# Patient Record
Sex: FEMALE | Race: WHITE | NOT HISPANIC OR LATINO | Employment: UNEMPLOYED | URBAN - METROPOLITAN AREA
[De-identification: names, ages, dates, MRNs, and addresses within clinical notes are randomized per-mention and may not be internally consistent; named-entity substitution may affect disease eponyms.]

---

## 2024-03-24 ENCOUNTER — HOSPITAL ENCOUNTER (EMERGENCY)
Facility: HOSPITAL | Age: 29
Discharge: HOME | End: 2024-03-24
Attending: EMERGENCY MEDICINE | Admitting: EMERGENCY MEDICINE
Payer: MEDICAID

## 2024-03-24 ENCOUNTER — APPOINTMENT (OUTPATIENT)
Dept: ULTRASOUND IMAGING | Facility: HOSPITAL | Age: 29
End: 2024-03-24
Payer: MEDICAID

## 2024-03-24 ENCOUNTER — APPOINTMENT (OUTPATIENT)
Dept: CT IMAGING | Facility: HOSPITAL | Age: 29
End: 2024-03-24
Payer: MEDICAID

## 2024-03-24 VITALS
HEIGHT: 67 IN | DIASTOLIC BLOOD PRESSURE: 94 MMHG | BODY MASS INDEX: 34.12 KG/M2 | WEIGHT: 217.4 LBS | HEART RATE: 73 BPM | OXYGEN SATURATION: 98 % | RESPIRATION RATE: 16 BRPM | SYSTOLIC BLOOD PRESSURE: 132 MMHG | TEMPERATURE: 98.7 F

## 2024-03-24 DIAGNOSIS — N83.202 LEFT OVARIAN CYST: Primary | ICD-10-CM

## 2024-03-24 DIAGNOSIS — R10.13 EPIGASTRIC PAIN: ICD-10-CM

## 2024-03-24 LAB
ALBUMIN SERPL-MCNC: 4.5 G/DL (ref 3.5–5.2)
ALBUMIN/GLOB SERPL: 1.8 G/DL
ALP SERPL-CCNC: 121 U/L (ref 39–117)
ALT SERPL W P-5'-P-CCNC: 70 U/L (ref 1–33)
ANION GAP SERPL CALCULATED.3IONS-SCNC: 4.1 MMOL/L (ref 5–15)
AST SERPL-CCNC: 48 U/L (ref 1–32)
B-HCG UR QL: NEGATIVE
BASOPHILS # BLD AUTO: 0.04 10*3/MM3 (ref 0–0.2)
BASOPHILS NFR BLD AUTO: 0.6 % (ref 0–1.5)
BILIRUB SERPL-MCNC: 0.3 MG/DL (ref 0–1.2)
BILIRUB UR QL STRIP: NEGATIVE
BUN SERPL-MCNC: 5 MG/DL (ref 6–20)
BUN/CREAT SERPL: 8.1 (ref 7–25)
CALCIUM SPEC-SCNC: 8.8 MG/DL (ref 8.6–10.5)
CHLORIDE SERPL-SCNC: 101 MMOL/L (ref 98–107)
CLARITY UR: CLEAR
CO2 SERPL-SCNC: 31.9 MMOL/L (ref 22–29)
COLOR UR: YELLOW
CREAT SERPL-MCNC: 0.62 MG/DL (ref 0.57–1)
DEPRECATED RDW RBC AUTO: 42.6 FL (ref 37–54)
EGFRCR SERPLBLD CKD-EPI 2021: 123.8 ML/MIN/1.73
EOSINOPHIL # BLD AUTO: 0.02 10*3/MM3 (ref 0–0.4)
EOSINOPHIL NFR BLD AUTO: 0.3 % (ref 0.3–6.2)
ERYTHROCYTE [DISTWIDTH] IN BLOOD BY AUTOMATED COUNT: 12.4 % (ref 12.3–15.4)
GLOBULIN UR ELPH-MCNC: 2.5 GM/DL
GLUCOSE SERPL-MCNC: 114 MG/DL (ref 65–99)
GLUCOSE UR STRIP-MCNC: NEGATIVE MG/DL
HCT VFR BLD AUTO: 40.5 % (ref 34–46.6)
HGB BLD-MCNC: 13.3 G/DL (ref 12–15.9)
HGB UR QL STRIP.AUTO: NEGATIVE
IMM GRANULOCYTES # BLD AUTO: 0.02 10*3/MM3 (ref 0–0.05)
IMM GRANULOCYTES NFR BLD AUTO: 0.3 % (ref 0–0.5)
KETONES UR QL STRIP: NEGATIVE
LEUKOCYTE ESTERASE UR QL STRIP.AUTO: NEGATIVE
LIPASE SERPL-CCNC: 33 U/L (ref 13–60)
LYMPHOCYTES # BLD AUTO: 1.33 10*3/MM3 (ref 0.7–3.1)
LYMPHOCYTES NFR BLD AUTO: 19.6 % (ref 19.6–45.3)
MCH RBC QN AUTO: 30.3 PG (ref 26.6–33)
MCHC RBC AUTO-ENTMCNC: 32.8 G/DL (ref 31.5–35.7)
MCV RBC AUTO: 92.3 FL (ref 79–97)
MONOCYTES # BLD AUTO: 0.4 10*3/MM3 (ref 0.1–0.9)
MONOCYTES NFR BLD AUTO: 5.9 % (ref 5–12)
NEUTROPHILS NFR BLD AUTO: 4.98 10*3/MM3 (ref 1.7–7)
NEUTROPHILS NFR BLD AUTO: 73.3 % (ref 42.7–76)
NITRITE UR QL STRIP: NEGATIVE
PH UR STRIP.AUTO: 7 [PH] (ref 5–8)
PLATELET # BLD AUTO: 327 10*3/MM3 (ref 140–450)
PMV BLD AUTO: 9.1 FL (ref 6–12)
POTASSIUM SERPL-SCNC: 4 MMOL/L (ref 3.5–5.2)
PROT SERPL-MCNC: 7 G/DL (ref 6–8.5)
PROT UR QL STRIP: NEGATIVE
RBC # BLD AUTO: 4.39 10*6/MM3 (ref 3.77–5.28)
SODIUM SERPL-SCNC: 137 MMOL/L (ref 136–145)
SP GR UR STRIP: 1.02 (ref 1–1.03)
UROBILINOGEN UR QL STRIP: NORMAL
WBC NRBC COR # BLD AUTO: 6.79 10*3/MM3 (ref 3.4–10.8)

## 2024-03-24 PROCEDURE — 99285 EMERGENCY DEPT VISIT HI MDM: CPT

## 2024-03-24 PROCEDURE — 81003 URINALYSIS AUTO W/O SCOPE: CPT

## 2024-03-24 PROCEDURE — 63710000001 PROMETHAZINE PER 25 MG: Performed by: PHYSICIAN ASSISTANT

## 2024-03-24 PROCEDURE — 93976 VASCULAR STUDY: CPT

## 2024-03-24 PROCEDURE — 25810000003 SODIUM CHLORIDE 0.9 % SOLUTION: Performed by: PHYSICIAN ASSISTANT

## 2024-03-24 PROCEDURE — 25510000001 IOPAMIDOL PER 1 ML: Performed by: EMERGENCY MEDICINE

## 2024-03-24 PROCEDURE — 74177 CT ABD & PELVIS W/CONTRAST: CPT

## 2024-03-24 PROCEDURE — 81025 URINE PREGNANCY TEST: CPT

## 2024-03-24 PROCEDURE — 76856 US EXAM PELVIC COMPLETE: CPT

## 2024-03-24 PROCEDURE — 80053 COMPREHEN METABOLIC PANEL: CPT | Performed by: PHYSICIAN ASSISTANT

## 2024-03-24 PROCEDURE — 85025 COMPLETE CBC W/AUTO DIFF WBC: CPT | Performed by: PHYSICIAN ASSISTANT

## 2024-03-24 PROCEDURE — 83690 ASSAY OF LIPASE: CPT | Performed by: PHYSICIAN ASSISTANT

## 2024-03-24 RX ORDER — ONDANSETRON 4 MG/1
4 TABLET, ORALLY DISINTEGRATING ORAL EVERY 8 HOURS PRN
Qty: 15 TABLET | Refills: 0 | Status: SHIPPED | OUTPATIENT
Start: 2024-03-24 | End: 2024-03-24

## 2024-03-24 RX ORDER — PROMETHAZINE HYDROCHLORIDE 25 MG/1
25 TABLET ORAL ONCE
Status: COMPLETED | OUTPATIENT
Start: 2024-03-24 | End: 2024-03-24

## 2024-03-24 RX ORDER — PROMETHAZINE HYDROCHLORIDE 25 MG/1
25 TABLET ORAL EVERY 6 HOURS PRN
Qty: 20 TABLET | Refills: 0 | Status: SHIPPED | OUTPATIENT
Start: 2024-03-24

## 2024-03-24 RX ORDER — PANTOPRAZOLE SODIUM 40 MG/1
40 TABLET, DELAYED RELEASE ORAL DAILY
Qty: 21 TABLET | Refills: 0 | Status: SHIPPED | OUTPATIENT
Start: 2024-03-24

## 2024-03-24 RX ORDER — SODIUM CHLORIDE 0.9 % (FLUSH) 0.9 %
10 SYRINGE (ML) INJECTION AS NEEDED
Status: DISCONTINUED | OUTPATIENT
Start: 2024-03-24 | End: 2024-03-24 | Stop reason: HOSPADM

## 2024-03-24 RX ORDER — ONDANSETRON 4 MG/1
4 TABLET, ORALLY DISINTEGRATING ORAL EVERY 8 HOURS PRN
Qty: 20 TABLET | Refills: 0 | Status: SHIPPED | OUTPATIENT
Start: 2024-03-24 | End: 2024-03-24

## 2024-03-24 RX ADMIN — PROMETHAZINE HYDROCHLORIDE 25 MG: 25 TABLET ORAL at 18:15

## 2024-03-24 RX ADMIN — IOPAMIDOL 100 ML: 755 INJECTION, SOLUTION INTRAVENOUS at 18:17

## 2024-03-24 RX ADMIN — SODIUM CHLORIDE 500 ML: 9 INJECTION, SOLUTION INTRAVENOUS at 17:27

## 2024-03-24 NOTE — Clinical Note
[facility] [place] [address] [place] [address] [place] [address]  Phone: [phone]    Caroline Llamas was seen and treated in our emergency department on 3/24/2024.  She may return to work on 03/26/2024.         Thank you for choosing [facility].    Shon Do III, PA

## 2024-03-24 NOTE — FSED PROVIDER NOTE
EMERGENCY DEPARTMENT ENCOUNTER    Room Number:  10/10  Date seen:  3/24/2024  Time seen: 19:59 EDT  PCP: Ledy Gottlieb APRN  Historian: Patient    Discussed/obtained information from independent historians: N/A  HPI:  Chief complaint: Vomiting epigastric pain  A complete HPI/ROS/PMH/PSH/SH/FH are unobtainable due to: Nothing  Context:Caroline Llamas is a 29 y.o. female who presents to the ED with c/o nausea vomiting epigastric pain.  Patient states symptoms of intermittent and ongoing for the past few months.  She has seen her PCP a few times about the same issue and has tried various OTC medications with no help.  Vomitus is said to be nonbloody and nonbilious.  There are no dark tarry stools or abnormal bowel movements.  There is been no fever or chills, dysuria, hematuria.  Patient denies pregnancy.  She is here for further evaluation.    External (non-ED) record review: Patient was seen by Dr. Jordan Boyer 4/11/2016 as a follow-up for a lap cholecystectomy.  It appears there was some erythema and signs of secondary cellulitis.  Patient was treated with Keflex and Bactroban ointment.  Wound care was discussed.  Patient was discharged to follow-up with a Dr. Blanton on Thursday for a repeat evaluation.    Chronic or social conditions impacting care:    ALLERGIES  Patient has no known allergies.    PAST MEDICAL HISTORY  Active Ambulatory Problems     Diagnosis Date Noted    No Active Ambulatory Problems     Resolved Ambulatory Problems     Diagnosis Date Noted    No Resolved Ambulatory Problems     Past Medical History:   Diagnosis Date    Gallstone     Hepatitis C        PAST SURGICAL HISTORY  Past Surgical History:   Procedure Laterality Date    ADENOIDECTOMY      CHOLECYSTECTOMY N/A 3/25/2016    Procedure: CHOLECYSTECTOMY LAPAROSCOPIC ;  Surgeon: Alexy Blanton MD;  Location: [facility];  Service:     EAR TUBES      TONSILLECTOMY         FAMILY HISTORY  History reviewed. No pertinent family  history.    SOCIAL HISTORY  Social History     Socioeconomic History    Marital status: Single   Tobacco Use    Smoking status: Every Day     Current packs/day: 0.50     Average packs/day: 0.5 packs/day for 4.0 years (2.0 ttl pk-yrs)     Types: Cigarettes    Smokeless tobacco: Never   Substance and Sexual Activity    Alcohol use: No    Drug use: No    Sexual activity: Defer       REVIEW OF SYSTEMS  Review of Systems    All systems reviewed and negative except for those discussed in HPI.     PHYSICAL EXAM    I have reviewed the triage vital signs and nursing notes.  Vitals:    03/24/24 1817   BP: 139/95   Pulse: 85   Resp: 18   Temp:    SpO2: 100%     Physical Exam    GENERAL: WDWN female, not distressed  HENT: nares patent  EYES: no scleral icterus  NECK: no ROM limitations  CV: regular rhythm, regular rate  RESPIRATORY: normal effort  ABDOMEN: TTP epigastric region of the abdomen, no guarding, no rebound, bowel sounds unremarkable.  No obvious mass noted on exam.    : deferred  MUSCULOSKELETAL: no deformity  NEURO: alert, moves all extremities, follows commands  SKIN: warm, dry    LAB RESULTS  Recent Results (from the past 24 hour(s))   Urinalysis With Culture If Indicated - Urine, Clean Catch    Collection Time: 03/24/24  4:46 PM    Specimen: Urine, Clean Catch   Result Value Ref Range    Color, UA Yellow Yellow, Straw    Appearance, UA Clear Clear    pH, UA 7.0 5.0 - 8.0    Specific Gravity, UA 1.025 1.005 - 1.030    Glucose, UA Negative Negative    Ketones, UA Negative Negative    Bilirubin, UA Negative Negative    Blood, UA Negative Negative    Protein, UA Negative Negative    Leuk Esterase, UA Negative Negative    Nitrite, UA Negative Negative    Urobilinogen, UA 0.2 E.U./dL 0.2 - 1.0 E.U./dL   Pregnancy, Urine - Urine, Clean Catch    Collection Time: 03/24/24  4:46 PM    Specimen: Urine, Clean Catch   Result Value Ref Range    HCG, Urine QL Negative Negative   Comprehensive Metabolic Panel    Collection  Time: 03/24/24  5:27 PM    Specimen: Blood   Result Value Ref Range    Glucose 114 (H) 65 - 99 mg/dL    BUN 5 (L) 6 - 20 mg/dL    Creatinine 0.62 0.57 - 1.00 mg/dL    Sodium 137 136 - 145 mmol/L    Potassium 4.0 3.5 - 5.2 mmol/L    Chloride 101 98 - 107 mmol/L    CO2 31.9 (H) 22.0 - 29.0 mmol/L    Calcium 8.8 8.6 - 10.5 mg/dL    Total Protein 7.0 6.0 - 8.5 g/dL    Albumin 4.5 3.5 - 5.2 g/dL    ALT (SGPT) 70 (H) 1 - 33 U/L    AST (SGOT) 48 (H) 1 - 32 U/L    Alkaline Phosphatase 121 (H) 39 - 117 U/L    Total Bilirubin 0.3 0.0 - 1.2 mg/dL    Globulin 2.5 gm/dL    A/G Ratio 1.8 g/dL    BUN/Creatinine Ratio 8.1 7.0 - 25.0    Anion Gap 4.1 (L) 5.0 - 15.0 mmol/L    eGFR 123.8 >60.0 mL/min/1.73   Lipase    Collection Time: 03/24/24  5:27 PM    Specimen: Blood   Result Value Ref Range    Lipase 33 13 - 60 U/L   CBC Auto Differential    Collection Time: 03/24/24  5:27 PM    Specimen: Blood   Result Value Ref Range    WBC 6.79 3.40 - 10.80 10*3/mm3    RBC 4.39 3.77 - 5.28 10*6/mm3    Hemoglobin 13.3 12.0 - 15.9 g/dL    Hematocrit 40.5 34.0 - 46.6 %    MCV 92.3 79.0 - 97.0 fL    MCH 30.3 26.6 - 33.0 pg    MCHC 32.8 31.5 - 35.7 g/dL    RDW 12.4 12.3 - 15.4 %    RDW-SD 42.6 37.0 - 54.0 fl    MPV 9.1 6.0 - 12.0 fL    Platelets 327 140 - 450 10*3/mm3    Neutrophil % 73.3 42.7 - 76.0 %    Lymphocyte % 19.6 19.6 - 45.3 %    Monocyte % 5.9 5.0 - 12.0 %    Eosinophil % 0.3 0.3 - 6.2 %    Basophil % 0.6 0.0 - 1.5 %    Immature Grans % 0.3 0.0 - 0.5 %    Neutrophils, Absolute 4.98 1.70 - 7.00 10*3/mm3    Lymphocytes, Absolute 1.33 0.70 - 3.10 10*3/mm3    Monocytes, Absolute 0.40 0.10 - 0.90 10*3/mm3    Eosinophils, Absolute 0.02 0.00 - 0.40 10*3/mm3    Basophils, Absolute 0.04 0.00 - 0.20 10*3/mm3    Immature Grans, Absolute 0.02 0.00 - 0.05 10*3/mm3       Ordered the above labs and independently interpreted results.  My findings will be discussed in the ED course or medical decision making section below    RADIOLOGY RESULTS  US Pelvis  Complete    Result Date: 3/24/2024  US PELVIS COMPLETE Date of Exam: 3/24/2024 6:47 PM EDT Indication: Eval for torsion. Comparison: No comparisons available. Technique: Transabdominal and transvaginal ultrasound evaluation of the pelvis was performed utilizing grayscale and color Doppler technique.  Doppler spectral analysis was performed. Findings: The uterus measures 9.2 x 4.5 x 4.3 cm. Homogeneous 8 mm endometrium. The right ovary measures 2.9 cm in greatest dimension with normal color Doppler flow. A left ovarian large cystic lesion measuring around 8 cm is present with some peripheral flow appreciated. No additional secondary evidence of torsion is definitely present.     Impression: 8 cm cystic left ovarian finding with some peripheral vascularity and no additional secondary features to suggest torsion on the left. Consider gynecologic consultation. Electronically Signed: Edmundo Walters MD  3/24/2024 7:48 PM EDT  Workstation ID: [id number]    CT Abdomen Pelvis With Contrast    Result Date: 3/24/2024  CT ABDOMEN PELVIS W CONTRAST Date of Exam: 3/24/2024 6:10 PM EDT Indication: abdominal pain. Comparison: None available. Technique: Axial CT images were obtained of the abdomen and pelvis following the uneventful intravenous administration of iodinated contrast. Sagittal and coronal reconstructions were performed.  Automated exposure control and iterative reconstruction methods were used. FINDINGS: Lung bases: No masses. No consolidation. Liver:No masses. No intrahepatic biliary ductal dilatation. Spleen:No masses. No perisplenic hematoma. Pancreas:No pancreatic masses. No evidence of pancreatitis. Gallbladder and common bile duct: Prior cholecystectomy Adrenal glands:No adrenal masses Kidneys and ureters:No kidney stones. No renal masses.No calculi present within the ureters. Normal caliber ureters. Urinary bladder:No urinary bladder wall thickening. No bladder masses. Small bowel:Normal caliber small bowel. Large  bowel:No diverticulosis or diverticulitis. No large bowel masses are appreciated Appendix: Normal GENITOURINARY: Normal uterus. 7.4 cm x 6.7 cm left-sided cystic structure presumably ovarian. Ultrasound of the pelvis recommended. Ascites or pneumoperitoneum:None. Adenopathy:None present Osseous structures:Normal Other findings: None     Large left ovarian cyst measuring 7.4 cm. Ultrasound of the pelvis recommended. Electronically Signed: José Miguel Cheung MD  3/24/2024 6:25 PM EDT  Workstation ID: [id number]      Ordered the above noted radiological studies.  Independently interpreted by me.  My findings will be discussed in the medical decision section below.     PROGRESS, DATA ANALYSIS, CONSULTS AND MEDICAL DECISION MAKING    Please note that this section constitutes my independent interpretation of clinical data including lab results, radiology, EKG's.  This constitutes my independent professional opinion regarding differential diagnosis and management of this patient.  It may include any factors such as history from outside sources, review of external records, social determinants of health, management of medications, response to those treatments, and discussions with other providers.    ED Course as of 03/24/24 2003   Sun Mar 24, 2024   1654 BP: 151/91 [RC]   1654 Temp: 98.7 °F (37.1 °C) [RC]   1654 Heart Rate: 74 [RC]   1654 Resp: 18 [RC]   1654 SpO2: 97 %  RA [RC]   1929 IMPRESSION:  Large left ovarian cyst measuring 7.4 cm. Ultrasound of the pelvis recommended.      [RC]   1955 IMPRESSION:  Impression:  8 cm cystic left ovarian finding with some peripheral vascularity and no additional secondary features to suggest torsion on the left. Consider gynecologic consultation.           Electronically Signed: Edmundo Walters MD    3/24/2024 7:48 PM EDT    Workstation ID: [id number]                 [RC]   1955 Patient really not having any significant left lower quadrant pain.  Ultrasound shows good blood flow. [RC]   1955   Suspect the cause of the patient's nausea vomiting is more stomach related.  Will place on a PPI and give GI follow-up since this is been an ongoing issue for the patient.  Will also refer to the on-call GYN so the cyst can be followed definitively managed.  Patient knows to return to the ER with sudden left lower quadrant pain, fever, or should her symptoms fail to improve with the medications prescribed. [RC]      ED Course User Index  [RC] Sohn Do III, PA     Orders placed during this visit:  Orders Placed This Encounter   Procedures    CT Abdomen Pelvis With Contrast    US Pelvis Complete    Urinalysis With Culture If Indicated - Urine, Clean Catch    Pregnancy, Urine - Urine, Clean Catch    Comprehensive Metabolic Panel    Lipase    CBC Auto Differential    Insert peripheral IV    CBC & Differential    ED Acknowledgement Form Needed;            Medical Decision Making  Problems Addressed:  Epigastric pain: complicated acute illness or injury  Left ovarian cyst: complicated acute illness or injury    Amount and/or Complexity of Data Reviewed  Labs: ordered.  Radiology: ordered.    Risk  Prescription drug management.      Dyspepsia, gastritis, duodenitis, acute pancreatitis, gastroparesis, partial SBO, other intra-abdominal process.  Symptoms been ongoing for greater than a month and the patient is getting no better.  Will obtain CBC, CMP, lipase, UA, CT abdomen pelvis with IV contrast to further evaluate.  See MDM for remainder of decision making process      DIAGNOSIS  Final diagnoses:   Left ovarian cyst   Epigastric pain          Medication List        New Prescriptions      ondansetron ODT 4 MG disintegrating tablet  Commonly known as: ZOFRAN-ODT  Place 1 tablet on the tongue Every 8 (Eight) Hours As Needed for Nausea or Vomiting.     pantoprazole 40 MG EC tablet  Commonly known as: PROTONIX  Take 1 tablet by mouth Daily.               Where to Get Your Medications        These medications  were sent to [facility]/pharmacy #[id number] - [facility] [place] - [address] [facility] [address] [facility] [address] - [phone]  - [phone] FX  [address] [facility] [place] [address]      Phone: [phone]   ondansetron ODT 4 MG disintegrating tablet  pantoprazole 40 MG EC tablet         FOLLOW-UP  [facility]  [address] [facility] [address]  [facility] [address] [phone]  Schedule an appointment as soon as possible for a visit   For further evaluation and treatment of large left ovarian cyst    [facility]  [address] [facility] [address] [phone]  Schedule an appointment as soon as possible for a visit   For further evaluation and treatment        Latest Documented Vital Signs:  As of 20:03 EDT  BP- 139/95 HR- 85 Temp- 98.7 °F (37.1 °C) O2 sat- 100%    Appropriate PPE utilized throughout this patient encounter to include mask, if indicated, per current protocol. Hand hygiene was performed before donning PPE and after removal when leaving the room.    Please note that portions of this were completed with a voice recognition program.     Note Disclaimer: At [facility], we believe that sharing information builds trust and better relationships. You are receiving this note because you are receiving care at [facility] or recently visited. It is possible you will see health information before a provider has talked with you about it. This kind of information can be easy to misunderstand. To help you fully understand what it means for your health, we urge you to discuss this note with your provider.

## 2024-03-24 NOTE — Clinical Note
[facility] [place] [address] [place] [address] [place] [address]  Phone: [phone]    Caroline Llamas was seen and treated in our emergency department on 3/24/2024.  She may return to work on 03/26/2024.         Thank you for choosing [facility].    Shon Do III, PA

## 2024-05-09 ENCOUNTER — HOSPITAL ENCOUNTER (OUTPATIENT)
Dept: CARDIOLOGY | Facility: HOSPITAL | Age: 29
Discharge: HOME | End: 2024-05-09
Payer: MEDICAID

## 2024-05-09 ENCOUNTER — TRANSCRIBE ORDERS (OUTPATIENT)
Dept: ADMINISTRATIVE | Facility: HOSPITAL | Age: 29
End: 2024-05-09
Payer: MEDICAID

## 2024-05-09 ENCOUNTER — LAB (OUTPATIENT)
Dept: LAB | Facility: HOSPITAL | Age: 29
End: 2024-05-09
Payer: MEDICAID

## 2024-05-09 DIAGNOSIS — Z01.818 OTHER SPECIFIED PRE-OPERATIVE EXAMINATION: Primary | ICD-10-CM

## 2024-05-09 DIAGNOSIS — Z01.818 OTHER SPECIFIED PRE-OPERATIVE EXAMINATION: ICD-10-CM

## 2024-05-09 LAB
ANION GAP SERPL CALCULATED.3IONS-SCNC: 12 MMOL/L (ref 5–15)
BACTERIA UR QL AUTO: ABNORMAL /HPF
BASOPHILS # BLD AUTO: 0.05 10*3/MM3 (ref 0–0.2)
BASOPHILS NFR BLD AUTO: 0.5 % (ref 0–1.5)
BILIRUB UR QL STRIP: NEGATIVE
BUN SERPL-MCNC: 8 MG/DL (ref 6–20)
BUN/CREAT SERPL: 12.1 (ref 7–25)
CALCIUM SPEC-SCNC: 9.4 MG/DL (ref 8.6–10.5)
CHLORIDE SERPL-SCNC: 101 MMOL/L (ref 98–107)
CLARITY UR: CLEAR
CO2 SERPL-SCNC: 26 MMOL/L (ref 22–29)
COLOR UR: YELLOW
CREAT SERPL-MCNC: 0.66 MG/DL (ref 0.57–1)
DEPRECATED RDW RBC AUTO: 40.8 FL (ref 37–54)
EGFRCR SERPLBLD CKD-EPI 2021: 122 ML/MIN/1.73
EOSINOPHIL # BLD AUTO: 0.02 10*3/MM3 (ref 0–0.4)
EOSINOPHIL NFR BLD AUTO: 0.2 % (ref 0.3–6.2)
ERYTHROCYTE [DISTWIDTH] IN BLOOD BY AUTOMATED COUNT: 12.4 % (ref 12.3–15.4)
GLUCOSE SERPL-MCNC: 100 MG/DL (ref 65–99)
GLUCOSE UR STRIP-MCNC: NEGATIVE MG/DL
HCT VFR BLD AUTO: 39.8 % (ref 34–46.6)
HGB BLD-MCNC: 13.3 G/DL (ref 12–15.9)
HGB UR QL STRIP.AUTO: NEGATIVE
HYALINE CASTS UR QL AUTO: ABNORMAL /LPF
IMM GRANULOCYTES # BLD AUTO: 0.04 10*3/MM3 (ref 0–0.05)
IMM GRANULOCYTES NFR BLD AUTO: 0.4 % (ref 0–0.5)
KETONES UR QL STRIP: ABNORMAL
LEUKOCYTE ESTERASE UR QL STRIP.AUTO: ABNORMAL
LYMPHOCYTES # BLD AUTO: 2.02 10*3/MM3 (ref 0.7–3.1)
LYMPHOCYTES NFR BLD AUTO: 20.3 % (ref 19.6–45.3)
MCH RBC QN AUTO: 30.4 PG (ref 26.6–33)
MCHC RBC AUTO-ENTMCNC: 33.4 G/DL (ref 31.5–35.7)
MCV RBC AUTO: 90.9 FL (ref 79–97)
MONOCYTES # BLD AUTO: 0.77 10*3/MM3 (ref 0.1–0.9)
MONOCYTES NFR BLD AUTO: 7.7 % (ref 5–12)
NEUTROPHILS NFR BLD AUTO: 7.04 10*3/MM3 (ref 1.7–7)
NEUTROPHILS NFR BLD AUTO: 70.9 % (ref 42.7–76)
NITRITE UR QL STRIP: NEGATIVE
NRBC BLD AUTO-RTO: 0 /100 WBC (ref 0–0.2)
PH UR STRIP.AUTO: 6 [PH] (ref 5–8)
PLATELET # BLD AUTO: 343 10*3/MM3 (ref 140–450)
PMV BLD AUTO: 9.6 FL (ref 6–12)
POTASSIUM SERPL-SCNC: 4.2 MMOL/L (ref 3.5–5.2)
PROT UR QL STRIP: NEGATIVE
QT INTERVAL: 371 MS
QTC INTERVAL: 411 MS
RBC # BLD AUTO: 4.38 10*6/MM3 (ref 3.77–5.28)
RBC # UR STRIP: ABNORMAL /HPF
REF LAB TEST METHOD: ABNORMAL
SODIUM SERPL-SCNC: 139 MMOL/L (ref 136–145)
SP GR UR STRIP: 1.02 (ref 1–1.03)
SQUAMOUS #/AREA URNS HPF: ABNORMAL /HPF
UROBILINOGEN UR QL STRIP: ABNORMAL
WBC # UR STRIP: ABNORMAL /HPF
WBC NRBC COR # BLD AUTO: 9.94 10*3/MM3 (ref 3.4–10.8)

## 2024-05-09 PROCEDURE — 80048 BASIC METABOLIC PNL TOTAL CA: CPT

## 2024-05-09 PROCEDURE — 93005 ELECTROCARDIOGRAM TRACING: CPT | Performed by: OBSTETRICS & GYNECOLOGY

## 2024-05-09 PROCEDURE — 87086 URINE CULTURE/COLONY COUNT: CPT

## 2024-05-09 PROCEDURE — 36415 COLL VENOUS BLD VENIPUNCTURE: CPT

## 2024-05-09 PROCEDURE — 81001 URINALYSIS AUTO W/SCOPE: CPT

## 2024-05-09 PROCEDURE — 85025 COMPLETE CBC W/AUTO DIFF WBC: CPT

## 2024-05-11 LAB — BACTERIA SPEC AEROBE CULT: NO GROWTH

## 2025-01-02 ENCOUNTER — HOSPITAL ENCOUNTER (OUTPATIENT)
Facility: HOSPITAL | Age: 30
Discharge: HOME | End: 2025-01-02
Attending: EMERGENCY MEDICINE
Payer: MEDICAID

## 2025-01-02 VITALS
HEIGHT: 66 IN | DIASTOLIC BLOOD PRESSURE: 86 MMHG | SYSTOLIC BLOOD PRESSURE: 125 MMHG | TEMPERATURE: 98 F | WEIGHT: 167.6 LBS | BODY MASS INDEX: 26.93 KG/M2 | HEART RATE: 72 BPM | OXYGEN SATURATION: 100 % | RESPIRATION RATE: 18 BRPM

## 2025-01-02 DIAGNOSIS — L98.9 BENIGN SKIN LESION OF MULTIPLE SITES: Primary | ICD-10-CM

## 2025-01-02 PROCEDURE — G0463 HOSPITAL OUTPT CLINIC VISIT: HCPCS | Performed by: EMERGENCY MEDICINE

## 2025-01-02 PROCEDURE — 99213 OFFICE O/P EST LOW 20 MIN: CPT | Performed by: NURSE PRACTITIONER

## 2025-01-02 RX ORDER — HYDROXYZINE PAMOATE 50 MG/1
50 CAPSULE ORAL 3 TIMES DAILY PRN
Qty: 21 CAPSULE | Refills: 0 | Status: SHIPPED | OUTPATIENT
Start: 2025-01-02 | End: 2025-01-09

## 2025-01-02 RX ORDER — SULFAMETHOXAZOLE AND TRIMETHOPRIM 800; 160 MG/1; MG/1
1 TABLET ORAL 2 TIMES DAILY
Qty: 20 TABLET | Refills: 0 | Status: SHIPPED | OUTPATIENT
Start: 2025-01-02 | End: 2025-01-12

## 2025-01-02 RX ORDER — CEPHALEXIN 500 MG/1
500 CAPSULE ORAL 4 TIMES DAILY
Qty: 40 CAPSULE | Refills: 0 | Status: SHIPPED | OUTPATIENT
Start: 2025-01-02 | End: 2025-01-12

## 2025-01-02 NOTE — DISCHARGE INSTRUCTIONS
Call for a follow up appointment with your primary care for further evaluation and treatment and referral to dermatology,     We have not diagnosed you with MRSA, just provided you with the information.     Medications as prescribed.     Do not pop or pick at the areas. Use a antibacterial soap when showering.     Tylenol/Motrin as needed for pain/fevers    Make sure patient is drinking plenty of fluids.    Return for any new or worsening symptoms.      Voice recognition transcription technology was used for documentation on this chart.  Result there may be some typos and/or introduced into the chart that were overlooked during editing reviewing.

## 2025-01-02 NOTE — FSED PROVIDER NOTE
"Subjective   History of Present Illness  The patient is during interview presents to the ER with a \"rash\" to her back, buttocks, bilateral lower extremities for the past week.  Patient reports that she has been \" popping them\". Patient reports she has been washing and scrubbing the areas.     History provided by:  Patient   used: No        Review of Systems   Skin:  Positive for rash and wound.       Past Medical History:   Diagnosis Date    Gallstone     sched lap allan    Hepatitis C        No Known Allergies    Past Surgical History:   Procedure Laterality Date    ADENOIDECTOMY      CHOLECYSTECTOMY N/A 3/25/2016    Procedure: CHOLECYSTECTOMY LAPAROSCOPIC ;  Surgeon: Alexy Blanton MD;  Location: [facility];  Service:     EAR TUBES      TONSILLECTOMY         No family history on file.    Social History     Socioeconomic History    Marital status: Single   Tobacco Use    Smoking status: Every Day     Current packs/day: 0.50     Average packs/day: 0.5 packs/day for 4.0 years (2.0 ttl pk-yrs)     Types: Cigarettes    Smokeless tobacco: Never   Substance and Sexual Activity    Alcohol use: No    Drug use: No    Sexual activity: Defer           Objective   Physical Exam  Vitals and nursing note reviewed.   Constitutional:       Appearance: Normal appearance.   HENT:      Head: Normocephalic.   Skin:     General: Skin is warm and dry.      Findings: Erythema and lesion present.      Comments: Patient with multiple skin lesions noted.  Patient with multiple skin lesions to the bilateral buttock area, has one to the left side of the abdomen. Patient with area of erythema noted to the areas as well. Patient has no drainage noted from any of the areas at this time.    Neurological:      General: No focal deficit present.      Mental Status: She is alert and oriented to person, place, and time.   Psychiatric:         Mood and Affect: Mood normal.         Behavior: Behavior normal. Behavior is " "cooperative.         Procedures           ED Course                                           Medical Decision Making  The patient is during interview presents to the ER with a \"rash\" to her back, buttocks, bilateral lower extremities for the past week.  Patient reports that she has been \" popping them\". Patient reports she has been washing and scrubbing the areas.   Patient with multiple skin lesions noted.  Patient with multiple skin lesions to the bilateral buttock area, has one to the left side of the abdomen. Patient with area of erythema noted to the areas as well. Patient has no drainage noted from the area.     This patient presents with initial presentation of local erythema, warmth, swelling concerning for cellulitis. Suspect patient has MRSA of these lesions.  Sensitivity/pain to light touch around the erythematous area. No lymphangitic spread visible and no fluid pockets or fluctuance concerning for abscess noted. Low concern for osteomyelitis or DVT. No immune compromise, bullae, pain out of proportion, or rapid progression concerning for necrotizing fasciitis. Patient to be discharged home with  bactrim and keflex with follow up with their PMD for possible referral to dermatology     Problems Addressed:  Benign skin lesion of multiple sites: complicated acute illness or injury    Risk  Prescription drug management.        Final diagnoses:   Benign skin lesion of multiple sites       ED Disposition  ED Disposition       ED Disposition   Discharge    Condition   Stable    Comment   --               Ledy Gottlieb, APRN  [address] [facility] [place] [address] [phone]    Schedule an appointment as soon as possible for a visit in 1 week  call tomorrow for follow up appointment, and possible referral to dermatology         Medication List        New Prescriptions      cephalexin 500 MG capsule  Commonly known as: KEFLEX  Take 1 capsule by mouth 4 (Four) Times a Day for 10 days.     hydrOXYzine " pamoate 50 MG capsule  Commonly known as: VISTARIL  Take 1 capsule by mouth 3 (Three) Times a Day As Needed for Itching for up to 7 days.     sulfamethoxazole-trimethoprim 800-160 MG per tablet  Commonly known as: BACTRIM DS,SEPTRA DS  Take 1 tablet by mouth 2 (Two) Times a Day for 10 days.               Where to Get Your Medications        These medications were sent to [address] [place] - [address] - [phone]  - [phone]   [address] [place] [address]      Phone: [phone]   cephalexin 500 MG capsule  hydrOXYzine pamoate 50 MG capsule  sulfamethoxazole-trimethoprim 800-160 MG per tablet       I was in the department and available for consult.  I did not provide any direct patient care at this time